# Patient Record
Sex: FEMALE | URBAN - NONMETROPOLITAN AREA
[De-identification: names, ages, dates, MRNs, and addresses within clinical notes are randomized per-mention and may not be internally consistent; named-entity substitution may affect disease eponyms.]

---

## 2023-02-14 ENCOUNTER — OFFICE VISIT (OUTPATIENT)
Dept: URBAN - NONMETROPOLITAN AREA CLINIC 2 | Facility: CLINIC | Age: 38
End: 2023-02-14

## 2023-02-14 ENCOUNTER — LAB OUTSIDE AN ENCOUNTER (OUTPATIENT)
Dept: URBAN - NONMETROPOLITAN AREA CLINIC 2 | Facility: CLINIC | Age: 38
End: 2023-02-14

## 2023-02-14 VITALS
WEIGHT: 160 LBS | TEMPERATURE: 98.5 F | SYSTOLIC BLOOD PRESSURE: 105 MMHG | BODY MASS INDEX: 24.25 KG/M2 | HEIGHT: 68 IN | HEART RATE: 69 BPM | DIASTOLIC BLOOD PRESSURE: 64 MMHG

## 2023-02-14 DIAGNOSIS — R74.8 ELEVATED LIVER ENZYMES: ICD-10-CM

## 2023-02-14 PROCEDURE — 99204 OFFICE O/P NEW MOD 45 MIN: CPT | Performed by: INTERNAL MEDICINE

## 2023-02-14 PROCEDURE — 99244 OFF/OP CNSLTJ NEW/EST MOD 40: CPT | Performed by: INTERNAL MEDICINE

## 2023-02-14 NOTE — HPI-TODAY'S VISIT:
2/14/2023 Kelly is a 37-year-old female referred to me by Dr. Trupti Golden for consultation of elevated liver enzymes.  A copy of this note be sent to the referring physician.  Approximately 15 years ago she had routine blood work and was told that her AST was mildly elevated.  She is undergone an extensive work-up throughout the years.  Most recently in 2020 after delivering her third child she was referred to Dr. Juventino De Jesus.  He did an ultrasound which shows gallbladder polyps and normal liver, she had a FibroScan with no fibrosis or steatosis.  Her AST is 3 times her ALT and mildly elevated in the 40s.  Her ALESIA, smooth muscle antibody, antimitochondrial antibody, hepatitis panel, ceruloplasmin, and alpha-1 antitrypsin were all unremarkable.  She has no family history of liver disease.  She does not take any over-the-counter supplements.  She does not take over-the-counter pain medications often, she may take ibuprofen or Tylenol once a month.  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  She did discontinue this for 2 weeks in the past with repeat of her lab work with no change.  She has not had her antiliver kidney month antibody tested, she has not been tested for celiac disease, her father does have Hashimoto's, she has never been tested for any of the viral serologies that could cause hepatitis including EBV and CMV.  She is not had any contrasted imaging of her liver, she is never had a liver biopsy.  We discussed any topical ointments that she may be using, she does not use any topical ointments, she does not have any work exposures of concern.  On further questioning she states that she does chew 1 to 2 packs of gum every day and has done so for the past 15 years.  She has no unprofessional tattoos.  Today she is doing well otherwise and is a healthy person.  She has no GI complaints.  She is not a heavy green tea drinker.  MB

## 2023-02-16 LAB
ABSOLUTE BASOPHILS: 49
ABSOLUTE EOSINOPHILS: 0
ABSOLUTE LYMPHOCYTES: 1766
ABSOLUTE MONOCYTES: 324
ABSOLUTE NEUTROPHILS: 3262
ALBUMIN/GLOBULIN RATIO: 1.8
ALBUMIN: 4.3
ALKALINE PHOSPHATASE: 48
ALT (SGPT): 16
AST (SGOT): 56
BASOPHILS: 0.9
BILIRUBIN, DIRECT: 0.1
BILIRUBIN, INDIRECT: 0.2
BILIRUBIN, TOTAL: 0.3
BUN/CREATININE RATIO: (no result)
CALCIUM: 9.1
CARBON DIOXIDE: 26
CHLORIDE: 109
CREATINE KINASE,TOTAL: 127
CREATININE: 0.7
CYTOMEGALOVIRUS (CMV) AB, IGM: <30
CYTOMEGALOVIRUS ANTIBODY (IGG): <0.6
EBV NUCLEAR AG (EBNA) AB (IGG): 82.3
EBV VIRAL CAPSID AG (VCA) AB (IGG): 81.8
EBV VIRAL CAPSID AG (VCA) AB (IGM): <36
EGFR: 114
EOSINOPHILS: 0
GLOBULIN: 2.4
GLUCOSE: 80
HBSAG SCREEN: (no result)
HEMATOCRIT: 38.8
HEMOGLOBIN: 13.2
HEP A AB, IGM: (no result)
HEP B CORE AB, IGM: (no result)
HEP C VIRUS AB: <0.02
HEPATITIS C ANTIBODY: (no result)
IMMUNOGLOBULIN G, QN, SERUM: 1135
INTERPRETATION:: (no result)
LKM-1 ANTIBODY (IGG): <=20
LYMPHOCYTES: 32.7
MCH: 30.3
MCHC: 34
MCV: 89.2
MONOCYTES: 6
MPV: 11
NEUTROPHILS: 60.4
PLATELET COUNT: 219
POTASSIUM: 4.4
PROTEIN, TOTAL: 6.7
RDW: 12.6
RED BLOOD CELL COUNT: 4.35
SODIUM: 141
TSH W/REFLEX TO FT4: 1.42
UREA NITROGEN (BUN): 16
WHITE BLOOD CELL COUNT: 5.4

## 2023-02-17 ENCOUNTER — TELEPHONE ENCOUNTER (OUTPATIENT)
Dept: URBAN - METROPOLITAN AREA CLINIC 92 | Facility: CLINIC | Age: 38
End: 2023-02-17

## 2023-02-21 PROBLEM — 707724006: Status: ACTIVE | Noted: 2023-02-14

## 2023-03-24 ENCOUNTER — OFFICE VISIT (OUTPATIENT)
Dept: URBAN - NONMETROPOLITAN AREA CLINIC 1 | Facility: CLINIC | Age: 38
End: 2023-03-24

## 2023-03-24 DIAGNOSIS — K82.4 GALLBLADDER POLYP: ICD-10-CM

## 2023-03-24 PROCEDURE — 76705 ECHO EXAM OF ABDOMEN: CPT | Performed by: INTERNAL MEDICINE

## 2023-03-28 ENCOUNTER — TELEPHONE ENCOUNTER (OUTPATIENT)
Dept: URBAN - METROPOLITAN AREA CLINIC 35 | Facility: CLINIC | Age: 38
End: 2023-03-28

## 2023-03-28 ENCOUNTER — LAB OUTSIDE AN ENCOUNTER (OUTPATIENT)
Dept: URBAN - METROPOLITAN AREA CLINIC 92 | Facility: CLINIC | Age: 38
End: 2023-03-28

## 2023-03-28 ENCOUNTER — LAB OUTSIDE AN ENCOUNTER (OUTPATIENT)
Dept: URBAN - NONMETROPOLITAN AREA CLINIC 2 | Facility: CLINIC | Age: 38
End: 2023-03-28

## 2023-03-29 ENCOUNTER — TELEPHONE ENCOUNTER (OUTPATIENT)
Dept: URBAN - METROPOLITAN AREA CLINIC 35 | Facility: CLINIC | Age: 38
End: 2023-03-29

## 2023-03-29 LAB — GGT: 9

## 2023-04-06 ENCOUNTER — WEB ENCOUNTER (OUTPATIENT)
Dept: URBAN - NONMETROPOLITAN AREA CLINIC 2 | Facility: CLINIC | Age: 38
End: 2023-04-06

## 2023-04-10 ENCOUNTER — WEB ENCOUNTER (OUTPATIENT)
Dept: URBAN - NONMETROPOLITAN AREA CLINIC 2 | Facility: CLINIC | Age: 38
End: 2023-04-10

## 2023-04-12 LAB
ALBUMIN/GLOBULIN RATIO: 2
ALBUMIN: 4.4
ALKALINE PHOSPHATASE: 56
ALT (SGPT): 12
AST (SGOT): 49
BILIRUBIN, DIRECT: 0.1
BILIRUBIN, INDIRECT: 0.3
BILIRUBIN, TOTAL: 0.4
GLOBULIN: 2.2
PROTEIN, TOTAL: 6.6

## 2023-04-13 ENCOUNTER — TELEPHONE ENCOUNTER (OUTPATIENT)
Dept: URBAN - METROPOLITAN AREA CLINIC 35 | Facility: CLINIC | Age: 38
End: 2023-04-13

## 2023-04-26 ENCOUNTER — WEB ENCOUNTER (OUTPATIENT)
Dept: URBAN - NONMETROPOLITAN AREA CLINIC 2 | Facility: CLINIC | Age: 38
End: 2023-04-26

## 2023-04-26 ENCOUNTER — LAB OUTSIDE AN ENCOUNTER (OUTPATIENT)
Dept: URBAN - METROPOLITAN AREA CLINIC 35 | Facility: CLINIC | Age: 38
End: 2023-04-26

## 2023-04-27 ENCOUNTER — WEB ENCOUNTER (OUTPATIENT)
Dept: URBAN - NONMETROPOLITAN AREA CLINIC 2 | Facility: CLINIC | Age: 38
End: 2023-04-27

## 2023-05-14 ENCOUNTER — LAB OUTSIDE AN ENCOUNTER (OUTPATIENT)
Dept: URBAN - NONMETROPOLITAN AREA CLINIC 2 | Facility: CLINIC | Age: 38
End: 2023-05-14

## 2023-05-23 ENCOUNTER — LAB OUTSIDE AN ENCOUNTER (OUTPATIENT)
Dept: URBAN - NONMETROPOLITAN AREA CLINIC 2 | Facility: CLINIC | Age: 38
End: 2023-05-23

## 2023-05-24 LAB
AP CASE REPORT: (no result)
AP FINAL DIAGNOSIS: (no result)
AP GROSS DESCRIPTION: (no result)
AP MICROSCOPIC DESCRIPTION: (no result)
AP SPECIAL STAINS: (no result)

## 2023-05-30 ENCOUNTER — WEB ENCOUNTER (OUTPATIENT)
Dept: URBAN - NONMETROPOLITAN AREA CLINIC 2 | Facility: CLINIC | Age: 38
End: 2023-05-30

## 2023-05-30 ENCOUNTER — TELEPHONE ENCOUNTER (OUTPATIENT)
Dept: URBAN - NONMETROPOLITAN AREA CLINIC 2 | Facility: CLINIC | Age: 38
End: 2023-05-30

## 2023-06-13 ENCOUNTER — OFFICE VISIT (OUTPATIENT)
Dept: URBAN - NONMETROPOLITAN AREA CLINIC 2 | Facility: CLINIC | Age: 38
End: 2023-06-13

## 2023-11-10 ENCOUNTER — OFFICE VISIT (OUTPATIENT)
Dept: URBAN - NONMETROPOLITAN AREA CLINIC 2 | Facility: CLINIC | Age: 38
End: 2023-11-10

## 2023-11-30 ENCOUNTER — LAB OUTSIDE AN ENCOUNTER (OUTPATIENT)
Dept: URBAN - NONMETROPOLITAN AREA CLINIC 2 | Facility: CLINIC | Age: 38
End: 2023-11-30

## 2024-02-29 ENCOUNTER — OV EP (OUTPATIENT)
Dept: URBAN - NONMETROPOLITAN AREA CLINIC 2 | Facility: CLINIC | Age: 39
End: 2024-02-29

## 2024-02-29 VITALS
BODY MASS INDEX: 24.25 KG/M2 | TEMPERATURE: 98.6 F | HEIGHT: 68 IN | SYSTOLIC BLOOD PRESSURE: 108 MMHG | DIASTOLIC BLOOD PRESSURE: 69 MMHG | HEART RATE: 62 BPM | WEIGHT: 160 LBS

## 2024-02-29 NOTE — HPI-TODAY'S VISIT:
2/14/2023 Kelly is a 37-year-old female referred to me by Dr. Trupti Golden for consultation of elevated liver enzymes.  A copy of this note be sent to the referring physician.  Approximately 15 years ago she had routine blood work and was told that her AST was mildly elevated.  She is undergone an extensive work-up throughout the years.  Most recently in 2020 after delivering her third child she was referred to Dr. Juventino De Jesus.  He did an ultrasound which shows gallbladder polyps and normal liver, she had a FibroScan with no fibrosis or steatosis.  Her AST is 3 times her ALT and mildly elevated in the 40s.  Her ALESIA, smooth muscle antibody, antimitochondrial antibody, hepatitis panel, ceruloplasmin, and alpha-1 antitrypsin were all unremarkable.  She has no family history of liver disease.  She does not take any over-the-counter supplements.  She does not take over-the-counter pain medications often, she may take ibuprofen or Tylenol once a month.  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  She did discontinue this for 2 weeks in the past with repeat of her lab work with no change.  She has not had her antiliver kidney month antibody tested, she has not been tested for celiac disease, her father does have Hashimoto's, she has never been tested for any of the viral serologies that could cause hepatitis including EBV and CMV.  She is not had any contrasted imaging of her liver, she is never had a liver biopsy.  We discussed any topical ointments that she may be using, she does not use any topical ointments, she does not have any work exposures of concern.  On further questioning she states that she does chew 1 to 2 packs of gum every day and has done so for the past 15 years.  She has no unprofessional tattoos.  Today she is doing well otherwise and is a healthy person.  She has no GI complaints.  She is not a heavy green tea drinker.  MB 2/29/2024 Kelly presents for follow-up liver enzymes.  Her liver biopsy was completely normal.  Her LFTs remain mildly elevated with no significant fibrosis.  No change was seen without working out and off of alcohol completely.  She only drinks socially now maybe once or twice a month.  Today she is doing well and is due for repeat labs.  MB

## 2024-03-14 ENCOUNTER — OV EP (OUTPATIENT)
Dept: URBAN - NONMETROPOLITAN AREA CLINIC 2 | Facility: CLINIC | Age: 39
End: 2024-03-14

## 2024-11-12 ENCOUNTER — DASHBOARD ENCOUNTERS (OUTPATIENT)
Age: 39
End: 2024-11-12

## 2024-11-12 ENCOUNTER — OFFICE VISIT (OUTPATIENT)
Dept: URBAN - NONMETROPOLITAN AREA CLINIC 2 | Facility: CLINIC | Age: 39
End: 2024-11-12
Payer: COMMERCIAL

## 2024-11-12 VITALS
HEIGHT: 68 IN | TEMPERATURE: 98 F | WEIGHT: 164 LBS | BODY MASS INDEX: 24.86 KG/M2 | SYSTOLIC BLOOD PRESSURE: 110 MMHG | DIASTOLIC BLOOD PRESSURE: 74 MMHG | HEART RATE: 60 BPM

## 2024-11-12 DIAGNOSIS — R74.8 ELEVATED LIVER ENZYMES: ICD-10-CM

## 2024-11-12 PROCEDURE — 99214 OFFICE O/P EST MOD 30 MIN: CPT | Performed by: INTERNAL MEDICINE

## 2024-11-12 NOTE — HPI-TODAY'S VISIT:
2/14/2023 Kelly is a 37-year-old female referred to me by Dr. Trupti Golden for consultation of elevated liver enzymes.  A copy of this note be sent to the referring physician.  Approximately 15 years ago she had routine blood work and was told that her AST was mildly elevated.  She is undergone an extensive work-up throughout the years.  Most recently in 2020 after delivering her third child she was referred to Dr. Juventino De Jesus.  He did an ultrasound which shows gallbladder polyps and normal liver, she had a FibroScan with no fibrosis or steatosis.  Her AST is 3 times her ALT and mildly elevated in the 40s.  Her ALESIA, smooth muscle antibody, antimitochondrial antibody, hepatitis panel, ceruloplasmin, and alpha-1 antitrypsin were all unremarkable.  She has no family history of liver disease.  She does not take any over-the-counter supplements.  She does not take over-the-counter pain medications often, she may take ibuprofen or Tylenol once a month.  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  She did discontinue this for 2 weeks in the past with repeat of her lab work with no change.  She has not had her antiliver kidney month antibody tested, she has not been tested for celiac disease, her father does have Hashimoto's, she has never been tested for any of the viral serologies that could cause hepatitis including EBV and CMV.  She is not had any contrasted imaging of her liver, she is never had a liver biopsy.  We discussed any topical ointments that she may be using, she does not use any topical ointments, she does not have any work exposures of concern.  On further questioning she states that she does chew 1 to 2 packs of gum every day and has done so for the past 15 years.  She has no unprofessional tattoos.  Today she is doing well otherwise and is a healthy person.  She has no GI complaints.  She is not a heavy green tea drinker.  MB 2/29/2024 Kelly presents for follow-up liver enzymes.  Her liver biopsy was completely normal.  Her LFTs remain mildly elevated with no significant fibrosis.  No change was seen without working out and off of alcohol completely.  She only drinks socially now maybe once or twice a month.  Today she is doing well and is due for repeat labs.  MB 11/12/2024 The patient presents today for follow-up of her elevated AST.  Since her last visit, she has been doing well.  She had her labs rechecked last month, and her AST is now down to 40.  Her ALT, bilirubin, and the remainder of her liver enzymes have been normal.  She did have a normal liver biopsy as well.  Given her isolated AST, I do think this may be secondary to muscle.  We will continue to monitor this every 6 months.  We will see her back in the office in 6 months to recheck her liver enzymes.
(1) Other Diagnosis

## 2025-05-12 ENCOUNTER — OFFICE VISIT (OUTPATIENT)
Dept: URBAN - NONMETROPOLITAN AREA CLINIC 2 | Facility: CLINIC | Age: 40
End: 2025-05-12
Payer: COMMERCIAL

## 2025-05-12 DIAGNOSIS — R74.8 ELEVATED LIVER ENZYMES: ICD-10-CM

## 2025-05-12 PROCEDURE — 99213 OFFICE O/P EST LOW 20 MIN: CPT | Performed by: NURSE PRACTITIONER

## 2025-05-12 NOTE — HPI-TODAY'S VISIT:
2/14/2023 Kelly is a 37-year-old female referred to me by Dr. Trupti Golden for consultation of elevated liver enzymes.  A copy of this note be sent to the referring physician.  Approximately 15 years ago she had routine blood work and was told that her AST was mildly elevated.  She is undergone an extensive work-up throughout the years.  Most recently in 2020 after delivering her third child she was referred to Dr. Juventino De Jesus.  He did an ultrasound which shows gallbladder polyps and normal liver, she had a FibroScan with no fibrosis or steatosis.  Her AST is 3 times her ALT and mildly elevated in the 40s.  Her ALESIA, smooth muscle antibody, antimitochondrial antibody, hepatitis panel, ceruloplasmin, and alpha-1 antitrypsin were all unremarkable.  She has no family history of liver disease.  She does not take any over-the-counter supplements.  She does not take over-the-counter pain medications often, she may take ibuprofen or Tylenol once a month.  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  She did discontinue this for 2 weeks in the past with repeat of her lab work with no change.  She has not had her antiliver kidney month antibody tested, she has not been tested for celiac disease, her father does have Hashimoto's, she has never been tested for any of the viral serologies that could cause hepatitis including EBV and CMV.  She is not had any contrasted imaging of her liver, she is never had a liver biopsy.  We discussed any topical ointments that she may be using, she does not use any topical ointments, she does not have any work exposures of concern.  On further questioning she states that she does chew 1 to 2 packs of gum every day and has done so for the past 15 years.  She has no unprofessional tattoos.  Today she is doing well otherwise and is a healthy person.  She has no GI complaints.  She is not a heavy green tea drinker.  MB 2/29/2024 Kelly presents for follow-up liver enzymes.  Her liver biopsy was completely normal.  Her LFTs remain mildly elevated with no significant fibrosis.  No change was seen without working out and off of alcohol completely.  She only drinks socially now maybe once or twice a month.  Today she is doing well and is due for repeat labs.  MB 11/12/2024 The patient presents today for follow-up of her elevated AST.  Since her last visit, she has been doing well.  She had her labs rechecked last month, and her AST is now down to 40.  Her ALT, bilirubin, and the remainder of her liver enzymes have been normal.  She did have a normal liver biopsy as well.  Given her isolated AST, I do think this may be secondary to muscle.  We will continue to monitor this every 6 months.  We will see her back in the office in 6 months to recheck her liver enzymes. 3/12/2025 The patient presents for follow-up of elevated liver enzymes.  Since her last visit she has been doing great.  She denies any acute GI complaints.  She is not taking any supplements.  She continues to exercise regularly.  Today we will repeat her labs, consider hematology referral if her AST remains elevated.  MB

## 2025-05-13 ENCOUNTER — TELEPHONE ENCOUNTER (OUTPATIENT)
Dept: URBAN - NONMETROPOLITAN AREA CLINIC 2 | Facility: CLINIC | Age: 40
End: 2025-05-13

## 2025-05-13 LAB
A/G RATIO: 1.8
ABSOLUTE BASOPHILS: 67
ABSOLUTE EOSINOPHILS: 140
ABSOLUTE LYMPHOCYTES: 1745
ABSOLUTE MONOCYTES: 519
ABSOLUTE NEUTROPHILS: 3630
ALBUMIN: 4.3
ALKALINE PHOSPHATASE: 45
ALT (SGPT): 12
AST (SGOT): 46
BASOPHILS: 1.1
BILIRUBIN, TOTAL: 0.5
BUN/CREATININE RATIO: (no result)
BUN: 18
CALCIUM: 9
CARBON DIOXIDE, TOTAL: 26
CHLORIDE: 104
CREATININE: 0.83
EGFR: 92
EOSINOPHILS: 2.3
GLOBULIN, TOTAL: 2.4
GLUCOSE: 77
HEMATOCRIT: 37.8
HEMOGLOBIN: 12.3
INR: 1
LYMPHOCYTES: 28.6
MCH: 29.9
MCHC: 32.5
MCV: 91.7
MONOCYTES: 8.5
MPV: 10
NEUTROPHILS: 59.5
PLATELET COUNT: 293
POTASSIUM: 4.1
PROTEIN, TOTAL: 6.7
PT: 10.6
RDW: 12.6
RED BLOOD CELL COUNT: 4.12
SODIUM: 138
WHITE BLOOD CELL COUNT: 6.1

## 2025-05-20 ENCOUNTER — WEB ENCOUNTER (OUTPATIENT)
Dept: URBAN - NONMETROPOLITAN AREA CLINIC 2 | Facility: CLINIC | Age: 40
End: 2025-05-20